# Patient Record
Sex: FEMALE | Race: WHITE | NOT HISPANIC OR LATINO | Employment: PART TIME | URBAN - METROPOLITAN AREA
[De-identification: names, ages, dates, MRNs, and addresses within clinical notes are randomized per-mention and may not be internally consistent; named-entity substitution may affect disease eponyms.]

---

## 2022-12-15 ENCOUNTER — HOSPITAL ENCOUNTER (OUTPATIENT)
Dept: RADIOLOGY | Facility: MEDICAL CENTER | Age: 52
Discharge: HOME | End: 2022-12-15

## 2022-12-15 ENCOUNTER — APPOINTMENT (OUTPATIENT)
Dept: LAB | Facility: MEDICAL CENTER | Age: 52
End: 2022-12-15

## 2022-12-15 DIAGNOSIS — R10.84 ABDOMINAL PAIN, GENERALIZED: ICD-10-CM

## 2022-12-15 LAB
ALBUMIN SERPL BCP-MCNC: 3.7 G/DL (ref 3.5–5)
ALP SERPL-CCNC: 62 U/L (ref 46–116)
ALT SERPL W P-5'-P-CCNC: 27 U/L (ref 12–78)
AMYLASE SERPL-CCNC: 55 IU/L (ref 25–115)
ANION GAP SERPL CALCULATED.3IONS-SCNC: 6 MMOL/L (ref 4–13)
AST SERPL W P-5'-P-CCNC: 20 U/L (ref 5–45)
BASOPHILS # BLD AUTO: 0.01 THOUSANDS/ÂΜL (ref 0–0.1)
BASOPHILS NFR BLD AUTO: 0 % (ref 0–1)
BILIRUB SERPL-MCNC: 0.63 MG/DL (ref 0.2–1)
BUN SERPL-MCNC: 14 MG/DL (ref 5–25)
CALCIUM SERPL-MCNC: 9.1 MG/DL (ref 8.3–10.1)
CHLORIDE SERPL-SCNC: 104 MMOL/L (ref 96–108)
CO2 SERPL-SCNC: 25 MMOL/L (ref 21–32)
CREAT SERPL-MCNC: 0.8 MG/DL (ref 0.6–1.3)
EOSINOPHIL # BLD AUTO: 0.03 THOUSAND/ÂΜL (ref 0–0.61)
EOSINOPHIL NFR BLD AUTO: 1 % (ref 0–6)
ERYTHROCYTE [DISTWIDTH] IN BLOOD BY AUTOMATED COUNT: 11.4 % (ref 11.6–15.1)
GFR SERPL CREATININE-BSD FRML MDRD: 86 ML/MIN/1.73SQ M
GLUCOSE P FAST SERPL-MCNC: 97 MG/DL (ref 65–99)
HCT VFR BLD AUTO: 41.4 % (ref 34.8–46.1)
HGB BLD-MCNC: 13.9 G/DL (ref 11.5–15.4)
IMM GRANULOCYTES # BLD AUTO: 0.01 THOUSAND/UL (ref 0–0.2)
IMM GRANULOCYTES NFR BLD AUTO: 0 % (ref 0–2)
LIPASE SERPL-CCNC: 129 U/L (ref 73–393)
LYMPHOCYTES # BLD AUTO: 1.15 THOUSANDS/ÂΜL (ref 0.6–4.47)
LYMPHOCYTES NFR BLD AUTO: 25 % (ref 14–44)
MCH RBC QN AUTO: 32.1 PG (ref 26.8–34.3)
MCHC RBC AUTO-ENTMCNC: 33.6 G/DL (ref 31.4–37.4)
MCV RBC AUTO: 96 FL (ref 82–98)
MONOCYTES # BLD AUTO: 0.33 THOUSAND/ÂΜL (ref 0.17–1.22)
MONOCYTES NFR BLD AUTO: 7 % (ref 4–12)
NEUTROPHILS # BLD AUTO: 3.05 THOUSANDS/ÂΜL (ref 1.85–7.62)
NEUTS SEG NFR BLD AUTO: 67 % (ref 43–75)
NRBC BLD AUTO-RTO: 0 /100 WBCS
PLATELET # BLD AUTO: 263 THOUSANDS/UL (ref 149–390)
PMV BLD AUTO: 9.3 FL (ref 8.9–12.7)
POTASSIUM SERPL-SCNC: 4.1 MMOL/L (ref 3.5–5.3)
PROT SERPL-MCNC: 7.6 G/DL (ref 6.4–8.4)
RBC # BLD AUTO: 4.33 MILLION/UL (ref 3.81–5.12)
SODIUM SERPL-SCNC: 135 MMOL/L (ref 135–147)
WBC # BLD AUTO: 4.58 THOUSAND/UL (ref 4.31–10.16)

## 2023-01-06 ENCOUNTER — OFFICE VISIT (OUTPATIENT)
Dept: GASTROENTEROLOGY | Facility: CLINIC | Age: 53
End: 2023-01-06

## 2023-01-06 VITALS
HEIGHT: 63 IN | SYSTOLIC BLOOD PRESSURE: 105 MMHG | DIASTOLIC BLOOD PRESSURE: 80 MMHG | BODY MASS INDEX: 26.58 KG/M2 | HEART RATE: 86 BPM | WEIGHT: 150 LBS

## 2023-01-06 DIAGNOSIS — Z83.79 FAMILY HISTORY OF INFLAMMATORY BOWEL DISEASE: ICD-10-CM

## 2023-01-06 DIAGNOSIS — R11.12 PROJECTILE VOMITING WITH NAUSEA: ICD-10-CM

## 2023-01-06 DIAGNOSIS — K21.9 GASTROESOPHAGEAL REFLUX DISEASE WITHOUT ESOPHAGITIS: Primary | ICD-10-CM

## 2023-01-06 DIAGNOSIS — R11.0 CHRONIC NAUSEA: ICD-10-CM

## 2023-01-06 DIAGNOSIS — Z12.11 COLON CANCER SCREENING: ICD-10-CM

## 2023-01-06 PROBLEM — R11.2 NAUSEA AND VOMITING: Status: ACTIVE | Noted: 2023-01-06

## 2023-01-06 RX ORDER — FAMOTIDINE 20 MG/1
20 TABLET, FILM COATED ORAL 2 TIMES DAILY
Qty: 120 TABLET | Refills: 1 | Status: SHIPPED | OUTPATIENT
Start: 2023-01-06

## 2023-01-06 RX ORDER — LEVOTHYROXINE SODIUM 0.07 MG/1
75 TABLET ORAL DAILY
COMMUNITY
Start: 2022-11-02 | End: 2023-11-02

## 2023-01-06 NOTE — PROGRESS NOTES
[facility] - Outpatient Follow-up Note  Aminah Castano 50 y o  female MRN: [id number]  Encounter: [id number]          ASSESSMENT AND PLAN:      1  Gastroesophageal reflux disease without esophagitis   patient with chronic reflux  Had upper endoscopy done less than 3 years ago  She does have almost daily reflux now as well as chronic nausea  She uses Pepcid off and on  Has never used continuously  Various triggers include sauces fried foods and Jodie nuts which she is allergic to  - EGD; Future  - famotidine (PEPCID) 20 mg tablet; Take 1 tablet (20 mg total) by mouth 2 (two) times a day  Dispense: 120 tablet; Refill: 1    2  Chronic nausea   start approximately 10 years ago has gotten progressively worse  - EGD; Future  - famotidine (PEPCID) 20 mg tablet; Take 1 tablet (20 mg total) by mouth 2 (two) times a day  Dispense: 120 tablet; Refill: 1    3  Projectile vomiting with nausea   this lasted for about a week it has since resolved  No a Yen aphasia no dysphagia  Had a normal CMP CBC TSH lipase amylase and ultrasound  - EGD; Future  - famotidine (PEPCID) 20 mg tablet; Take 1 tablet (20 mg total) by mouth 2 (two) times a day  Dispense: 120 tablet; Refill: 1    4  Colon cancer screening    Due for screening  - Colonoscopy; Future    5  Family history of inflammatory bowel disease   distant cousin  She will otherwise follow-up in 4 months  - Colonoscopy; Future    ______________________________________________________________________    SUBJECTIVE:   Very pleasant 82-year-old female presents with persistent vomiting which has since resolved since making her appointment  She did have a normal ultrasound CMP CBC TSH amylase lipase  Since age 38 has had chronic nausea  She did have an EGD about 3 years ago  She has a distant cousin with inflammatory bowel disease she does have chronic reflux for which she takes Pepcid off and on    This is brought on by certain sauces fried foods and hazelnuts  She has not lost any weight except for the week when she was having her vomiting episode which has since resolved  She is due for colon cancer screening  REVIEW OF SYSTEMS IS OTHERWISE NEGATIVE  Historical Information   Past Medical History:   Diagnosis Date   • Hypothyroidism      Past Surgical History:   Procedure Laterality Date   • UPPER GASTROINTESTINAL ENDOSCOPY       Social History   Social History     Substance and Sexual Activity   Alcohol Use Yes     Social History     Substance and Sexual Activity   Drug Use Never     Social History     Tobacco Use   Smoking Status Never   Smokeless Tobacco Never     History reviewed  No pertinent family history  Meds/Allergies       Current Outpatient Medications:   •  famotidine (PEPCID) 20 mg tablet  •  levothyroxine 75 mcg tablet    Allergies   Allergen Reactions   • Ciprofloxacin GI Intolerance           Objective     Blood pressure 105/80, pulse 86, height 5' 3" (1 6 m), weight 68 kg (150 lb)  Body mass index is 26 57 kg/m²  PHYSICAL EXAM:      General Appearance:   Alert, cooperative, no distress   HEENT:   Normocephalic, atraumatic, anicteric      Neck:  Supple, symmetrical, trachea midline   Lungs:   Clear to auscultation bilaterally; no rales, rhonchi or wheezing; respirations unlabored    Heart[de-identified]   Regular rate and rhythm; no murmur, rub, or gallop  Abdomen:   Soft, non-tender, non-distended; normal bowel sounds; no masses, no organomegaly    Genitalia:   Deferred    Rectal:   Deferred    Extremities:  No cyanosis, clubbing or edema    Pulses:  2+ and symmetric    Skin:  No jaundice, rashes, or lesions    Lymph nodes:  No palpable cervical lymphadenopathy        Lab Results:   No visits with results within 1 Day(s) from this visit     Latest known visit with results is:   Transcribe Orders on 12/15/2022   Component Date Value   • Color, UA 12/15/2022 Light Yellow    • Clarity, UA 12/15/2022 Clear    • Specific [place], UA 12/15/2022 1 010    • pH, UA 12/15/2022 7 0    • Leukocytes, UA 12/15/2022 Negative    • Nitrite, UA 12/15/2022 Negative    • Protein, UA 12/15/2022 Negative    • Glucose, UA 12/15/2022 Negative    • Ketones, UA 12/15/2022 20 (1+) (A)    • Urobilinogen, UA 12/15/2022 <2 0    • Bilirubin, UA 12/15/2022 Negative    • Occult Blood, UA 12/15/2022 Small (A)    • RBC, UA 12/15/2022 4-10 (A)    • WBC, UA 12/15/2022 1-2    • Epithelial Cells 12/15/2022 Occasional    • Bacteria, UA 12/15/2022 Occasional    • Ca Oxalate [place], UA 12/15/2022 Occasional (A)          Radiology Results:   US right upper quadrant    Result Date: 12/15/2022  Narrative: RIGHT UPPER QUADRANT ULTRASOUND INDICATION:     R10 84: Generalized abdominal pain  COMPARISON:  None TECHNIQUE:   Real-time ultrasound of the right upper quadrant was performed with a curvilinear transducer with both volumetric sweeps and still imaging techniques  FINDINGS: PANCREAS:  Visualized portions of the pancreas are within normal limits  AORTA AND IVC:  Visualized portions are normal for patient age  LIVER: Size:  Within normal range  The liver measures 13 7 cm in the midclavicular line  Contour:  Surface contour is smooth  Parenchyma:  Echogenicity and echotexture are within normal limits  No liver mass identified  Limited imaging of the main portal vein shows it to be patent and hepatopetal   BILIARY: No gallbladder findings  No intrahepatic biliary dilatation  CBD measures 3 0 mm  No choledocholithiasis  KIDNEY: Right kidney measures 10 3 x 5 0 x 5 0 cm  Volume 135 0 mL Kidney within normal limits  ASCITES:   None       Impression: Normal  Workstation performed: [id number]

## 2023-02-10 ENCOUNTER — TELEPHONE (OUTPATIENT)
Dept: GASTROENTEROLOGY | Facility: CLINIC | Age: 53
End: 2023-02-10

## 2023-02-10 NOTE — TELEPHONE ENCOUNTER
Left message for patient reminding her of her procedures 2/23 with Dr Jono Farrell at [facility]  She will need a , be called day prior with arrival time and be on clear liquids with bowel prep day before  Any questions, she is to call

## 2023-02-22 ENCOUNTER — NURSE TRIAGE (OUTPATIENT)
Dept: OTHER | Facility: OTHER | Age: 53
End: 2023-02-22

## 2023-02-23 ENCOUNTER — TELEPHONE (OUTPATIENT)
Dept: OTHER | Facility: OTHER | Age: 53
End: 2023-02-23

## 2023-02-23 ENCOUNTER — TELEPHONE (OUTPATIENT)
Dept: GASTROENTEROLOGY | Facility: AMBULARY SURGERY CENTER | Age: 53
End: 2023-02-23

## 2023-02-23 ENCOUNTER — PATIENT MESSAGE (OUTPATIENT)
Dept: GASTROENTEROLOGY | Facility: CLINIC | Age: 53
End: 2023-02-23

## 2023-02-23 RX ORDER — SODIUM CHLORIDE, SODIUM LACTATE, POTASSIUM CHLORIDE, CALCIUM CHLORIDE 600; 310; 30; 20 MG/100ML; MG/100ML; MG/100ML; MG/100ML
125 INJECTION, SOLUTION INTRAVENOUS CONTINUOUS
Status: CANCELLED | OUTPATIENT
Start: 2023-02-23

## 2023-02-23 NOTE — TELEPHONE ENCOUNTER
SPOKE WITH PT, REPORTS SHE WAS DOING WELL WITH MIRALAX/GATORADE PREP UNTIL @8PM GOT SEVERELY NAUSEATED AND STARTED VOMITING WHICH LASTED THRU THE NIGHT. SHE WOULD LIKE TO KNOW WHAT YOU RECOMMEND. PLEASE SEE HER [facility] MESSAGE TO YOU.

## 2023-02-23 NOTE — TELEPHONE ENCOUNTER
Regarding: colonoscopy prep issue  ----- Message from Bravo Bradford sent at 2/22/2023  9:27 PM EST -----  "I have a colonoscopy scheduled for tomorrow and I throw up everything , I also have a bad headache and im still nauseous"

## 2023-02-23 NOTE — TELEPHONE ENCOUNTER
Patients GI provider:  Dr. Sims    Number to return call: (  [phone]    Reason for call: Pt calling to speak with a nurse. She was up at night vomiting and had to cancel her procedure    Scheduled procedure/appointment date if applicable:  N/A

## 2023-02-23 NOTE — TELEPHONE ENCOUNTER
Patient called in stating she threw up around 930 entire prep after taking it as instructed   Stats really bad headache in the back of her head which moves down into here neck, and nausea  States unsure if she can continue second half of prep  Spoke to on call  States if patient unable to continue with second half of prep she will need to r/s  Advised patient to continue with clear liquids and see if symptoms subside  If symptoms worsen patient needs to be seen in ED  Called pt  Advised on recommendations  Patient verbalized understanding  Reason for Disposition  • Abdominal bloating, cramping, nausea, or vomiting while drinking bowel prep, questions about    Answer Assessment - Initial Assessment Questions  1  DATE/TIME: "When did you have your colonoscopy?"       1/23/23  2  MAIN CONCERN: "What is your main concern right now?" "What questions do you have?"     Nausea headache back of head and neck  vomiting   3  ABDOMEN PAIN: "Are you having any abdomen (belly or stomach) pain?" If Yes, ask: "How bad is it?" (e g , Scale 1-10; mild, moderate, severe)  - MILD (1-3): doesn't interfere with normal activities, abdomen soft and not tender to touch      - MODERATE (4-7): interferes with normal activities or awakens from sleep, tender to touch      - SEVERE (8-10): excruciating pain, doubled over, unable to do any normal activities       denies  4  OTHER SYMPTOMS: "What other symptoms are you having?" (e g , rectal bleeding, bloating or feeling gassy, passing gas, vomiting, dizziness, fever)  chills  5  ONSET: "When did your symptoms start?"     20 min   6   PATTERN: "Is the symptom(s) constant or does it come and go?" "Is your symptom(s) getting worse, better, or staying the same?"     Feels like another  Wave is coming on    Protocols used: COLONOSCOPY SYMPTOMS AND QUESTIONS-ADULT-

## 2023-02-24 ENCOUNTER — TELEPHONE (OUTPATIENT)
Dept: GASTROENTEROLOGY | Facility: CLINIC | Age: 53
End: 2023-02-24

## 2023-02-24 DIAGNOSIS — Z12.11 COLON CANCER SCREENING: Primary | ICD-10-CM

## 2023-02-24 RX ORDER — SODIUM, POTASSIUM,MAG SULFATES 17.5-3.13G
SOLUTION, RECONSTITUTED, ORAL ORAL
Qty: 177 ML | Refills: 0 | Status: SHIPPED | OUTPATIENT
Start: 2023-02-24 | End: 2023-02-27 | Stop reason: ALTCHOICE

## 2023-02-24 RX ORDER — ONDANSETRON 4 MG/1
TABLET, ORALLY DISINTEGRATING ORAL
Qty: 2 TABLET | Refills: 0 | Status: SHIPPED | OUTPATIENT
Start: 2023-02-24

## 2023-02-24 NOTE — TELEPHONE ENCOUNTER
Scheduled date of EGD/colonoscopy (as of today): 4/14/23  Physician performing EGD/colonoscopy: Dr Kaila Kimbrough   Location of EGD/colonoscopy: [facility]  Desired bowel prep reviewed with patient: Suprep emailed   Instructions reviewed with patient by: guero  Clearances:  n/a

## 2023-02-24 NOTE — TELEPHONE ENCOUNTER
Maximo Liu 29 Assessment    Name: Tameka Rodríguez  YOB: 1970  Last Height: 5' 3" (1 6 m)  Last weight: 68 kg (150 lb)  BMI: 26 57 kg/m²  Procedure: Colon/EGD  Diagnosis: see order  Date of procedure: 4/14/23  Prep: Suprep emailed   Responsible : yes  Phone#: [phone]  Name completing form: Boni Alvaresn  Date form completed: 02/24/23      If the patient answers yes to any of these questions, schedule in a hospital  Are you pregnant: No  Do you rely on a wheelchair for mobility: No  Have you been diagnosed with End Stage Renal Disease (ESRD): No  Do you need oxygen during the day: No  Have you had a heart attack or stroke within the past three months: No  Have you had a seizure within the past three months: No  Have you ever been informed by anesthesia that you have a difficult airway: No  Additional Questions  Have you had any cardiac testing or are under the care of a Cardiologist (see cardiac list): No  Cardiac list:   Do you have an implanted cardiac defibrillator: No (Comment:  This patient should be scheduled in the hospital)    Have any bleeding problems, such as anemia or hemophilia (If patient has H&H result below 8, schedule in hospital   H&H must be within 30 days of procedure): No    Had an organ transplant within the past 3 months: No    Do you have any present infections: No  Do you get short of breath when walking a few blocks: No  Have you been diagnosed with diabetes: No  Comments (provide cardiac provider information if applicable):

## 2023-02-24 NOTE — TELEPHONE ENCOUNTER
Dr. Sims, I called and spoke to pt and rescheduled her for 4/14/23.  Could you please send in scripts for the Suprep, Reglan, and Zofran?  I emailed her the suprep instructions. Thank you so much!

## 2023-02-27 ENCOUNTER — TELEPHONE (OUTPATIENT)
Dept: GASTROENTEROLOGY | Facility: CLINIC | Age: 53
End: 2023-02-27

## 2023-02-27 DIAGNOSIS — Z12.11 COLON CANCER SCREENING: Primary | ICD-10-CM

## 2023-02-27 RX ORDER — SODIUM PICOSULFATE, MAGNESIUM OXIDE, AND ANHYDROUS CITRIC ACID 10; 3.5; 12 MG/160ML; G/160ML; G/160ML
160 LIQUID ORAL 2 TIMES DAILY
Qty: 320 ML | Refills: 0 | Status: SHIPPED | OUTPATIENT
Start: 2023-02-27

## 2023-02-27 NOTE — TELEPHONE ENCOUNTER
----- Message from Carey Hall MD sent at 2/27/2023  2:47 PM EST -----  Regarding: FW: Colonoscopy prep vomiting   Contact: [phone]  Have her do Clenpiq  ----- Message -----  From: Smita Ramires  Sent: 2/24/2023  10:35 AM EST  To: Carey Hall MD  Subject: FW: Colonoscopy prep vomiting                    Hi Dr Oleg Decker, just wanted to let you know before the patient decides about the Suprep, that we no longer can offer this prep  It is not one of our standard preps  We can only offer the Gatorade, Miralax, Dulcolax prep, Golytely, Sutab and Clenpiq  Thank you!  ----- Message -----  From: Florin Barber LPN  Sent: 8/33/7352   3:34 PM EST  To: Carey Hall MD, Smita Ramires  Subject: FW: Colonoscopy prep vomiting                      ----- Message -----  From: Jina Rios: 2/23/2023   3:10 PM EST  To: , #  Subject: Colonoscopy prep vomiting                        Ann Taylor  I apologize that I was unable to make the procedures today  After the 8pm pills I started to get very nauseous, I vomited several times w chills & severe headache  I called & spoke w a nurse who said to see if I could finish the other 1/2 of the mixture later, if not to call & cancel  I was vomiting through the night so I called at 6am to cancel  I found out that this message never found its way back to the office  Can I reschedule w another method?

## 2023-02-27 NOTE — TELEPHONE ENCOUNTER
----- Message from Marquis Rubio LPN sent at 3/51/6785  3:33 PM EST -----  Regarding: FW: Colonoscopy prep vomiting   Contact: [phone]    ----- Message -----  From: Jony David: 2/23/2023   3:10 PM EST  To: , #  Subject: Colonoscopy prep vomiting                        Ann Taylor  I apologize that I was unable to make the procedures today  After the 8pm pills I started to get very nauseous, I vomited several times w chills & severe headache  I called & spoke w a nurse who said to see if I could finish the other 1/2 of the mixture later, if not to call & cancel  I was vomiting through the night so I called at 6am to cancel  I found out that this message never found its way back to the office  Can I reschedule w another method?

## 2023-02-27 NOTE — TELEPHONE ENCOUNTER
Left message for patient to call and let us know which prep does she want us to send into pharmacy so I can get her the instructions  Will follow up with her in a couple of weeks if she doesn't return call

## 2023-02-27 NOTE — TELEPHONE ENCOUNTER
Left message for patient that I am emailing Clenpiq instructions and Dr Vy Mcmahon will be sending into pharmacy

## 2023-03-07 PROBLEM — Z12.11 COLON CANCER SCREENING: Status: RESOLVED | Noted: 2023-01-06 | Resolved: 2023-03-07

## 2023-03-20 DIAGNOSIS — R11.0 CHRONIC NAUSEA: ICD-10-CM

## 2023-03-20 DIAGNOSIS — R11.12 PROJECTILE VOMITING WITH NAUSEA: ICD-10-CM

## 2023-03-20 DIAGNOSIS — K21.9 GASTROESOPHAGEAL REFLUX DISEASE WITHOUT ESOPHAGITIS: ICD-10-CM

## 2023-03-20 RX ORDER — FAMOTIDINE 20 MG/1
20 TABLET, FILM COATED ORAL 2 TIMES DAILY
Qty: 120 TABLET | Refills: 1 | Status: SHIPPED | OUTPATIENT
Start: 2023-03-20

## 2023-09-14 ENCOUNTER — APPOINTMENT (EMERGENCY)
Dept: RADIOLOGY | Facility: HOSPITAL | Age: 53
End: 2023-09-14
Payer: COMMERCIAL

## 2023-09-14 ENCOUNTER — APPOINTMENT (EMERGENCY)
Dept: CT IMAGING | Facility: HOSPITAL | Age: 53
End: 2023-09-14
Payer: COMMERCIAL

## 2023-09-14 ENCOUNTER — HOSPITAL ENCOUNTER (EMERGENCY)
Facility: HOSPITAL | Age: 53
Discharge: HOME | End: 2023-09-14
Attending: EMERGENCY MEDICINE
Payer: COMMERCIAL

## 2023-09-14 VITALS
TEMPERATURE: 97.6 F | OXYGEN SATURATION: 97 % | RESPIRATION RATE: 16 BRPM | HEART RATE: 82 BPM | SYSTOLIC BLOOD PRESSURE: 121 MMHG | DIASTOLIC BLOOD PRESSURE: 79 MMHG

## 2023-09-14 DIAGNOSIS — R42 LIGHTHEADEDNESS: ICD-10-CM

## 2023-09-14 DIAGNOSIS — S46.811A TRAPEZIUS STRAIN, RIGHT, INITIAL ENCOUNTER: Primary | ICD-10-CM

## 2023-09-14 DIAGNOSIS — E87.6 HYPOKALEMIA: ICD-10-CM

## 2023-09-14 DIAGNOSIS — S16.1XXA STRAIN OF CERVICAL PORTION OF RIGHT TRAPEZIUS MUSCLE: ICD-10-CM

## 2023-09-14 LAB
ALBUMIN SERPL BCP-MCNC: 4.5 G/DL (ref 3.5–5)
ALP SERPL-CCNC: 55 U/L (ref 34–104)
ALT SERPL W P-5'-P-CCNC: 18 U/L (ref 7–52)
ANION GAP SERPL CALCULATED.3IONS-SCNC: 8 MMOL/L
APTT PPP: 27 SECONDS (ref 23–37)
AST SERPL W P-5'-P-CCNC: 18 U/L (ref 13–39)
ATRIAL RATE: 90 BPM
BACTERIA UR QL AUTO: ABNORMAL /HPF
BASOPHILS # BLD AUTO: 0.05 THOUSANDS/ÂΜL (ref 0–0.1)
BASOPHILS NFR BLD AUTO: 1 % (ref 0–1)
BILIRUB SERPL-MCNC: 0.33 MG/DL (ref 0.2–1)
BILIRUB UR QL STRIP: NEGATIVE
BUN SERPL-MCNC: 20 MG/DL (ref 5–25)
CALCIUM SERPL-MCNC: 9.9 MG/DL (ref 8.4–10.2)
CARDIAC TROPONIN I PNL SERPL HS: 2 NG/L
CHLORIDE SERPL-SCNC: 106 MMOL/L (ref 96–108)
CLARITY UR: CLEAR
CO2 SERPL-SCNC: 25 MMOL/L (ref 21–32)
COLOR UR: YELLOW
CREAT SERPL-MCNC: 0.88 MG/DL (ref 0.6–1.3)
EOSINOPHIL # BLD AUTO: 0.08 THOUSAND/ÂΜL (ref 0–0.61)
EOSINOPHIL NFR BLD AUTO: 1 % (ref 0–6)
ERYTHROCYTE [DISTWIDTH] IN BLOOD BY AUTOMATED COUNT: 11.3 % (ref 11.6–15.1)
FLUAV RNA RESP QL NAA+PROBE: NEGATIVE
FLUBV RNA RESP QL NAA+PROBE: NEGATIVE
GFR SERPL CREATININE-BSD FRML MDRD: 76 ML/MIN/1.73SQ M
GLUCOSE SERPL-MCNC: 94 MG/DL (ref 65–140)
GLUCOSE UR STRIP-MCNC: NEGATIVE MG/DL
HCT VFR BLD AUTO: 40.6 % (ref 34.8–46.1)
HGB BLD-MCNC: 13.8 G/DL (ref 11.5–15.4)
HGB UR QL STRIP.AUTO: ABNORMAL
IMM GRANULOCYTES # BLD AUTO: 0.03 THOUSAND/UL (ref 0–0.2)
IMM GRANULOCYTES NFR BLD AUTO: 0 % (ref 0–2)
INR PPP: 0.89 (ref 0.84–1.19)
KETONES UR STRIP-MCNC: NEGATIVE MG/DL
LACTATE SERPL-SCNC: 1.7 MMOL/L (ref 0.5–2)
LEUKOCYTE ESTERASE UR QL STRIP: NEGATIVE
LIPASE SERPL-CCNC: 21 U/L (ref 11–82)
LYMPHOCYTES # BLD AUTO: 1.46 THOUSANDS/ÂΜL (ref 0.6–4.47)
LYMPHOCYTES NFR BLD AUTO: 15 % (ref 14–44)
MAGNESIUM SERPL-MCNC: 2 MG/DL (ref 1.9–2.7)
MCH RBC QN AUTO: 31.9 PG (ref 26.8–34.3)
MCHC RBC AUTO-ENTMCNC: 34 G/DL (ref 31.4–37.4)
MCV RBC AUTO: 94 FL (ref 82–98)
MONOCYTES # BLD AUTO: 0.34 THOUSAND/ÂΜL (ref 0.17–1.22)
MONOCYTES NFR BLD AUTO: 4 % (ref 4–12)
MUCOUS THREADS UR QL AUTO: ABNORMAL
NEUTROPHILS # BLD AUTO: 7.73 THOUSANDS/ÂΜL (ref 1.85–7.62)
NEUTS SEG NFR BLD AUTO: 79 % (ref 43–75)
NITRITE UR QL STRIP: NEGATIVE
NON-SQ EPI CELLS URNS QL MICRO: ABNORMAL /HPF
NRBC BLD AUTO-RTO: 0 /100 WBCS
P AXIS: 66 DEGREES
PH UR STRIP.AUTO: 6 [PH]
PLATELET # BLD AUTO: 329 THOUSANDS/UL (ref 149–390)
PMV BLD AUTO: 9.3 FL (ref 8.9–12.7)
POTASSIUM SERPL-SCNC: 3.3 MMOL/L (ref 3.5–5.3)
PR INTERVAL: 162 MS
PROCALCITONIN SERPL-MCNC: <0.05 NG/ML
PROT SERPL-MCNC: 7.8 G/DL (ref 6.4–8.4)
PROT UR STRIP-MCNC: NEGATIVE MG/DL
PROTHROMBIN TIME: 12.6 SECONDS (ref 11.6–14.5)
QRS AXIS: 55 DEGREES
QRSD INTERVAL: 86 MS
QT INTERVAL: 350 MS
QTC INTERVAL: 428 MS
RBC # BLD AUTO: 4.33 MILLION/UL (ref 3.81–5.12)
RBC #/AREA URNS AUTO: ABNORMAL /HPF
RSV RNA RESP QL NAA+PROBE: NEGATIVE
SARS-COV-2 RNA RESP QL NAA+PROBE: NEGATIVE
SODIUM SERPL-SCNC: 139 MMOL/L (ref 135–147)
SP GR UR STRIP.AUTO: 1.03 (ref 1–1.03)
T WAVE AXIS: 33 DEGREES
UROBILINOGEN UR STRIP-ACNC: <2 MG/DL
VENTRICULAR RATE: 90 BPM
WBC # BLD AUTO: 9.69 THOUSAND/UL (ref 4.31–10.16)
WBC #/AREA URNS AUTO: ABNORMAL /HPF

## 2023-09-14 PROCEDURE — 99285 EMERGENCY DEPT VISIT HI MDM: CPT

## 2023-09-14 PROCEDURE — 84145 PROCALCITONIN (PCT): CPT | Performed by: EMERGENCY MEDICINE

## 2023-09-14 PROCEDURE — 96361 HYDRATE IV INFUSION ADD-ON: CPT

## 2023-09-14 PROCEDURE — 87040 BLOOD CULTURE FOR BACTERIA: CPT | Performed by: EMERGENCY MEDICINE

## 2023-09-14 PROCEDURE — 96375 TX/PRO/DX INJ NEW DRUG ADDON: CPT

## 2023-09-14 PROCEDURE — 93005 ELECTROCARDIOGRAM TRACING: CPT

## 2023-09-14 PROCEDURE — 71045 X-RAY EXAM CHEST 1 VIEW: CPT

## 2023-09-14 PROCEDURE — 86617 LYME DISEASE ANTIBODY: CPT | Performed by: EMERGENCY MEDICINE

## 2023-09-14 PROCEDURE — 86618 LYME DISEASE ANTIBODY: CPT | Performed by: EMERGENCY MEDICINE

## 2023-09-14 PROCEDURE — 81001 URINALYSIS AUTO W/SCOPE: CPT | Performed by: EMERGENCY MEDICINE

## 2023-09-14 PROCEDURE — 84484 ASSAY OF TROPONIN QUANT: CPT | Performed by: EMERGENCY MEDICINE

## 2023-09-14 PROCEDURE — 0241U HB NFCT DS VIR RESP RNA 4 TRGT: CPT | Performed by: EMERGENCY MEDICINE

## 2023-09-14 PROCEDURE — 85610 PROTHROMBIN TIME: CPT | Performed by: EMERGENCY MEDICINE

## 2023-09-14 PROCEDURE — 96374 THER/PROPH/DIAG INJ IV PUSH: CPT

## 2023-09-14 PROCEDURE — 93010 ELECTROCARDIOGRAM REPORT: CPT | Performed by: INTERNAL MEDICINE

## 2023-09-14 PROCEDURE — 83690 ASSAY OF LIPASE: CPT | Performed by: EMERGENCY MEDICINE

## 2023-09-14 PROCEDURE — 85730 THROMBOPLASTIN TIME PARTIAL: CPT | Performed by: EMERGENCY MEDICINE

## 2023-09-14 PROCEDURE — 70450 CT HEAD/BRAIN W/O DYE: CPT

## 2023-09-14 PROCEDURE — 85025 COMPLETE CBC W/AUTO DIFF WBC: CPT | Performed by: EMERGENCY MEDICINE

## 2023-09-14 PROCEDURE — 87207 SMEAR SPECIAL STAIN: CPT | Performed by: EMERGENCY MEDICINE

## 2023-09-14 PROCEDURE — 99285 EMERGENCY DEPT VISIT HI MDM: CPT | Performed by: EMERGENCY MEDICINE

## 2023-09-14 PROCEDURE — 83605 ASSAY OF LACTIC ACID: CPT | Performed by: EMERGENCY MEDICINE

## 2023-09-14 PROCEDURE — 87468 ANAPLSMA PHGCYTOPHLM AMP PRB: CPT | Performed by: EMERGENCY MEDICINE

## 2023-09-14 PROCEDURE — 36415 COLL VENOUS BLD VENIPUNCTURE: CPT | Performed by: EMERGENCY MEDICINE

## 2023-09-14 PROCEDURE — 83735 ASSAY OF MAGNESIUM: CPT | Performed by: EMERGENCY MEDICINE

## 2023-09-14 PROCEDURE — 80053 COMPREHEN METABOLIC PANEL: CPT | Performed by: EMERGENCY MEDICINE

## 2023-09-14 RX ORDER — ONDANSETRON 2 MG/ML
4 INJECTION INTRAMUSCULAR; INTRAVENOUS ONCE
Status: COMPLETED | OUTPATIENT
Start: 2023-09-14 | End: 2023-09-14

## 2023-09-14 RX ORDER — MECLIZINE HYDROCHLORIDE 25 MG/1
25 TABLET ORAL ONCE
Status: COMPLETED | OUTPATIENT
Start: 2023-09-14 | End: 2023-09-14

## 2023-09-14 RX ORDER — ACETAMINOPHEN 325 MG/1
650 TABLET ORAL ONCE
Status: COMPLETED | OUTPATIENT
Start: 2023-09-14 | End: 2023-09-14

## 2023-09-14 RX ORDER — POTASSIUM CHLORIDE 20 MEQ/1
40 TABLET, EXTENDED RELEASE ORAL ONCE
Status: COMPLETED | OUTPATIENT
Start: 2023-09-14 | End: 2023-09-14

## 2023-09-14 RX ORDER — LIDOCAINE 50 MG/G
1 PATCH TOPICAL ONCE
Status: DISCONTINUED | OUTPATIENT
Start: 2023-09-14 | End: 2023-09-14 | Stop reason: HOSPADM

## 2023-09-14 RX ORDER — KETOROLAC TROMETHAMINE 30 MG/ML
15 INJECTION, SOLUTION INTRAMUSCULAR; INTRAVENOUS ONCE
Status: COMPLETED | OUTPATIENT
Start: 2023-09-14 | End: 2023-09-14

## 2023-09-14 RX ADMIN — POTASSIUM CHLORIDE 40 MEQ: 1500 TABLET, EXTENDED RELEASE ORAL at 18:21

## 2023-09-14 RX ADMIN — LIDOCAINE 1 PATCH: 50 PATCH TOPICAL at 18:21

## 2023-09-14 RX ADMIN — ACETAMINOPHEN 650 MG: 325 TABLET, FILM COATED ORAL at 16:56

## 2023-09-14 RX ADMIN — ONDANSETRON 4 MG: 2 INJECTION INTRAMUSCULAR; INTRAVENOUS at 17:14

## 2023-09-14 RX ADMIN — KETOROLAC TROMETHAMINE 15 MG: 30 INJECTION, SOLUTION INTRAMUSCULAR; INTRAVENOUS at 18:21

## 2023-09-14 RX ADMIN — SODIUM CHLORIDE 1000 ML: 0.9 INJECTION, SOLUTION INTRAVENOUS at 17:17

## 2023-09-14 RX ADMIN — MECLIZINE HYDROCHLORIDE 25 MG: 25 TABLET ORAL at 16:56

## 2023-09-14 NOTE — ED PROVIDER NOTES
Pt Name: Tim Arguelles  MRN: [id number]  [address] 1970  Age/Sex: 50 y.o. female  Date of evaluation: 9/14/2023  PCP: Azul Hoffman MD    [address]    Chief Complaint   Patient presents with   • Weakness - Generalized     Pt reports body aches, stiff neck, shortness of breath, difficulty ambulating, headaches. Sent in by urgent care. Able to extend neck up and down, but complains of burning feeling         HPI and MDM    50 y.o. female presenting with neck pain. Patient states since yesterday morning she has had pain in her neck. Feels stiff. Primarily right-sided. She slept on a new pillow. Today she had chills, does not know if she had any fevers. Did not check her temperature. She states her temperature was not checked at her doctor's visit. Also mentions that she has been feeling lightheaded, has a history of vertigo. Has had nausea and vomiting. No chest pain or abdominal pain. No muscle aches or arthralgias. No known sick contacts, no recent traveling or surgeries. Denies any medical problems. Patient states she recently had a normal brain MRI. Differential diagnosis considered includes but not limited to muscular strain, vertigo, viral illness, electrolyte abnormalities, dehydration,, meningitis although low suspicion. Patient was seen by family doctor, sent to the emergency department with concern for possible meningitis. Her vitals in the emergency room reassuring, she is afebrile, no actual documented fever, did mention an episode of chills. My suspicion overall for meningitis is fairly low, patient does have a history of vertigo, her pain does appear to be musculoskeletal in nature, however we will obtain blood work, CT head. I discussed with patient, she is in agreement with reevaluation and blood work and then revisiting the topic of work-up for meningitis which would include lumbar puncture.     ED Course as of 09/14/23 1905   Thu Sep 14, 2023   1659 XR chest portable  Per my independent interpretation of chest x-ray, no acute cardiopulmonary processes. [id number] Per my independent interpretation of EKG, normal sinus rhythm heart rate 90, narrow QRS, normal axis, no was reassuring, no STEMI. Upon review of blood work, it is reassuring, no leukocytosis, procalcitonin is not elevated, no lactic acidosis. Mild hypokalemia, given supplemental potassium. UA not concerning for infection. Patient was updated with all results including CT head results. Upon reevaluation she is feeling significantly better, states she feels a lot better. I once again discussed with her the utility of obtaining a lumbar puncture to assess for possible meningitis, after shared decision making, patient would like to defer lumbar puncture at this time. Tickborne illness is also another possibility, therefore will obtain Lyme titers and parasite smear and anaplasmosis test.  Patient would like to pursue supportive care at this time, advise supportive care at home, close PCP follow-up, strict return precautions were discussed, patient verbalized understanding and is in agreement with plan. She was also provided with a report for her CT head results.     Medications   lidocaine (LIDODERM) 5 % patch 1 patch (1 patch Topical Medication Applied 9/14/23 1821)   sodium chloride 0.9 % bolus 1,000 mL (1,000 mL Intravenous New Bag 9/14/23 1717)   ondansetron (ZOFRAN) injection 4 mg (4 mg Intravenous Given 9/14/23 1714)   meclizine (ANTIVERT) tablet 25 mg (25 mg Oral Given 9/14/23 1656)   acetaminophen (TYLENOL) tablet 650 mg (650 mg Oral Given 9/14/23 1656)   ketorolac (TORADOL) injection 15 mg (15 mg Intravenous Given 9/14/23 1821)   potassium chloride (K-DUR,KLOR-CON) CR tablet 40 mEq (40 mEq Oral Given 9/14/23 1821)         Past Medical and Surgical History    Past Medical History:   Diagnosis Date   • Anxiety    • Chronic nausea    • GERD (gastroesophageal reflux disease)    • Hiatal hernia    • Hx of vertigo    • Hypothyroidism    • Migraines    • Scoliosis    • Seasonal allergies     denies postnasal drip today 4/14/23   • Spasm of esophagus    • TMJ (dislocation of temporomandibular joint)     see chiropractor       Past Surgical History:   Procedure Laterality Date   • CYST REMOVAL      neck  age 11   • UPPER GASTROINTESTINAL ENDOSCOPY         History reviewed. No pertinent family history. Social History     Tobacco Use   • Smoking status: Never   • Smokeless tobacco: Never   Vaping Use   • Vaping Use: Never used   Substance Use Topics   • Alcohol use: Yes     Alcohol/week: 5.0 standard drinks of alcohol     Types: 5 Glasses of wine per week     Comment: wine   • Drug use: Never           Allergies    Allergies   Allergen Reactions   • Ciprofloxacin GI Intolerance       Home Medications    Prior to Admission medications    Medication Sig Start Date End Date Taking?  Authorizing Provider   acetaminophen (TYLENOL) 325 mg tablet Take 650 mg by mouth every 6 (six) hours as needed for mild pain    Historical Provider, MD   buPROPion (WELLBUTRIN XL) 150 mg 24 hr tablet Take 150 mg by mouth every morning 1/13/23   Historical Provider, MD   levothyroxine 75 mcg tablet Take 75 mcg by mouth daily 11/2/22 11/2/23  Historical Provider, MD   omeprazole (PriLOSEC) 20 mg delayed release capsule TAKE 1 CAPSULE (20 MG TOTAL) BY MOUTH DAILY 6/27/23   Jovan Tucker MD   ondansetron (ZOFRAN-ODT) 4 mg disintegrating tablet Take 1 tablet by mouth prior to starting your bowel prep take the second 1 after completion of your bowel prep 2/24/23   Jovan Tucker MD           Physical Exam      ED Triage Vitals   Temperature Pulse Respirations Blood Pressure SpO2   09/14/23 1609 09/14/23 1609 09/14/23 1609 09/14/23 1609 09/14/23 1609   97.6 °F (36.4 °C) 88 18 162/72 99 %      Temp Source Heart Rate Source Patient Position - Orthostatic VS BP Location FiO2 (%)   09/14/23 1609 09/14/23 1609 09/14/23 1609 09/14/23 1609 --   Temporal Monitor Sitting Left arm       Pain Score       09/14/23 1656       5               Physical Exam  Constitutional:       General: She is not in acute distress. HENT:      Head: Normocephalic and atraumatic. Nose: Nose normal.      Mouth/Throat:      Mouth: Mucous membranes are moist.   Eyes:      Extraocular Movements: Extraocular movements intact. Pupils: Pupils are equal, round, and reactive to light. Neck:      Comments: Negative Brudzinski's test, no meningismus. Patient has primarily right paracervical and trapezius hypertonicity and tenderness. She has full range of motion of her neck. Cardiovascular:      Rate and Rhythm: Normal rate and regular rhythm. Pulmonary:      Effort: Pulmonary effort is normal. No respiratory distress. Abdominal:      General: There is no distension. Palpations: Abdomen is soft. Tenderness: There is no abdominal tenderness. Musculoskeletal:         General: No swelling or deformity. Normal range of motion. Cervical back: Normal range of motion and neck supple. Skin:     General: Skin is warm. Findings: No erythema. Neurological:      Mental Status: She is alert and oriented to person, place, and time. Cranial Nerves: No cranial nerve deficit. Sensory: No sensory deficit. Motor: No weakness.               Diagnostic Results      Labs:    Results Reviewed     Procedure Component Value Units Date/Time    Lyme Total AB W Reflex to IGM/IGG [[id number]]     Lab Status: No result Specimen: Blood     Blood Parasite smear [[id number]]     Lab Status: No result Specimen: Blood     Anaplasma Phagocytophilum, PCR [[id number]]     Lab Status: No result Specimen: Blood     Urine Microscopic [[id number]]  (Abnormal) Collected: 09/14/23 1824    Lab Status: Final result Specimen: Urine, Clean Catch Updated: 09/14/23 1837     RBC, UA 10-20 /hpf      WBC, UA None Seen /hpf      Epithelial Cells Occasional /hpf      Bacteria, UA None Seen /hpf      MUCUS THREADS Occasional    UA w Reflex to Microscopic w Reflex to Culture [[id number]]  (Abnormal) Collected: 09/14/23 1824    Lab Status: Final result Specimen: Urine, Clean Catch Updated: 09/14/23 1833     Color, UA Yellow     Clarity, UA Clear     Specific Gravity, UA 1.027     pH, UA 6.0     Leukocytes, UA Negative     Nitrite, UA Negative     Protein, UA Negative mg/dl      Glucose, UA Negative mg/dl      Ketones, UA Negative mg/dl      Urobilinogen, UA <2.0 mg/dl      Bilirubin, UA Negative     Occult Blood, UA Small    FLU/RSV/COVID - if FLU/RSV clinically relevant [[id number]]  (Normal) Collected: 09/14/23 1712    Lab Status: Final result Specimen: Nares from Nose Updated: 09/14/23 1808     SARS-CoV-2 Negative     INFLUENZA A PCR Negative     INFLUENZA B PCR Negative     RSV PCR Negative    Narrative:      FOR PEDIATRIC PATIENTS - copy/paste COVID Guidelines URL to browser: [email or web address]    SARS-CoV-2 assay is a Nucleic Acid Amplification assay intended for the  qualitative detection of nucleic acid from SARS-CoV-2 in nasopharyngeal  swabs. Results are for the presumptive identification of SARS-CoV-2 RNA. Positive results are indicative of infection with SARS-CoV-2, the virus  causing COVID-19, but do not rule out bacterial infection or co-infection  with other viruses. Laboratories within the [facility] and its  territories are required to report all positive results to the appropriate  public health authorities. Negative results do not preclude SARS-CoV-2  infection and should not be used as the sole basis for treatment or other  patient management decisions. Negative results must be combined with  clinical observations, patient history, and epidemiological information. This test has not been FDA cleared or approved. This test has been authorized by FDA under an Emergency Use Authorization  (EUA).  This test is only authorized for the duration of time the  declaration that circumstances exist justifying the authorization of the  emergency use of an in vitro diagnostic tests for detection of SARS-CoV-2  virus and/or diagnosis of COVID-19 infection under section 564(b)(1) of  the Act, 21 U. S.C. 200JWH-2(N)(1), unless the authorization is terminated  or revoked sooner. The test has been validated but independent review by FDA  and CLIA is pending. Test performed using [employer] GeneXpert: This RT-PCR assay targets N2,  a region unique to SARS-CoV-2. A conserved region in the E-gene was chosen  for pan-Sarbecovirus detection which includes SARS-CoV-2. According to CMS-2020-01-R, this platform meets the definition of high-throughput technology. Procalcitonin [[id number]]  (Normal) Collected: 09/14/23 1712    Lab Status: Final result Specimen: Blood from Arm, Left Updated: 09/14/23 1759     Procalcitonin <0.05 ng/ml     HS Troponin 0hr (reflex protocol) [[id number]]  (Normal) Collected: 09/14/23 1712    Lab Status: Final result Specimen: Blood from Arm, Left Updated: 09/14/23 1758     hs TnI 0hr 2 ng/L     HS Troponin I 2hr [[id number]]     Lab Status: No result Specimen: Blood     Lactic acid [[id number]]  (Normal) Collected: 09/14/23 1712    Lab Status: Final result Specimen: Blood from Arm, Left Updated: 09/14/23 1747     LACTIC ACID 1.7 mmol/L     Narrative:      Result may be elevated if tourniquet was used during collection.     Comprehensive metabolic panel [[id number]]  (Abnormal) Collected: 09/14/23 1712    Lab Status: Final result Specimen: Blood from Arm, Left Updated: 09/14/23 1747     Sodium 139 mmol/L      Potassium 3.3 mmol/L      Chloride 106 mmol/L      CO2 25 mmol/L      ANION GAP 8 mmol/L      BUN 20 mg/dL      Creatinine 0.88 mg/dL      Glucose 94 mg/dL      Calcium 9.9 mg/dL      AST 18 U/L      ALT 18 U/L      Alkaline Phosphatase 55 U/L      Total Protein 7.8 g/dL      Albumin 4.5 g/dL      Total Bilirubin 0.33 mg/dL      eGFR 76 ml/min/1.73sq m     Narrative:      [facility] guidelines for Chronic Kidney Disease (CKD):   •  Stage 1 with normal or high GFR (GFR > 90 mL/min/1.73 square meters)  •  Stage 2 Mild CKD (GFR = 60-89 mL/min/1.73 square meters)  •  Stage 3A Moderate CKD (GFR = 45-59 mL/min/1.73 square meters)  •  Stage 3B Moderate CKD (GFR = 30-44 mL/min/1.73 square meters)  •  Stage 4 Severe CKD (GFR = 15-29 mL/min/1.73 square meters)  •  Stage 5 End Stage CKD (GFR <15 mL/min/1.73 square meters)  Note: GFR calculation is accurate only with a steady state creatinine    Magnesium [[id number]]  (Normal) Collected: 09/14/23 1712    Lab Status: Final result Specimen: Blood from Arm, Left Updated: 09/14/23 1747     Magnesium 2.0 mg/dL     Lipase [[id number]]  (Normal) Collected: 09/14/23 1712    Lab Status: Final result Specimen: Blood from Arm, Left Updated: 09/14/23 1747     Lipase 21 u/L     Protime-INR [[id number]]  (Normal) Collected: 09/14/23 1712    Lab Status: Final result Specimen: Blood from Arm, Left Updated: 09/14/23 1746     Protime 12.6 seconds      INR 0.89    APTT [[id number]]  (Normal) Collected: 09/14/23 1712    Lab Status: Final result Specimen: Blood from Arm, Left Updated: 09/14/23 1746     PTT 27 seconds     CBC and differential [[id number]]  (Abnormal) Collected: 09/14/23 1712    Lab Status: Final result Specimen: Blood from Arm, Left Updated: 09/14/23 1727     WBC 9.69 Thousand/uL      RBC 4.33 Million/uL      Hemoglobin 13.8 g/dL      Hematocrit 40.6 %      MCV 94 fL      MCH 31.9 pg      MCHC 34.0 g/dL      RDW 11.3 %      MPV 9.3 fL      Platelets 167 Thousands/uL      nRBC 0 /100 WBCs      Neutrophils Relative 79 %      Immat GRANS % 0 %      Lymphocytes Relative 15 %      Monocytes Relative 4 %      Eosinophils Relative 1 %      Basophils Relative 1 %      Neutrophils Absolute 7.73 Thousands/µL      Immature Grans Absolute 0.03 Thousand/uL      Lymphocytes Absolute 1.46 Thousands/µL      Monocytes Absolute 0.34 Thousand/µL      Eosinophils Absolute 0.08 Thousand/µL      Basophils Absolute 0.05 Thousands/µL     Blood culture #1 [[id number]] Collected: 09/14/23 1712    Lab Status: In process Specimen: Blood from Arm, Left Updated: 09/14/23 1722    Blood culture #2 [[id number]] Collected: 09/14/23 1714    Lab Status: In process Specimen: Blood from Arm, Right Updated: 09/14/23 1722          All labs reviewed and utilized in the medical decision making process    Radiology:    XR chest portable   Final Result      No acute cardiopulmonary disease. In the setting of clinically suspected/proven COVID-19, this plain film appearance does not contain findings that raise concern for viral pneumonia such as COVID-19, but does not rule out this diagnosis. Marked thoracic dextroscoliosis. Workstation performed: [id number]         CT head without contrast   Final Result      No acute intracranial abnormality. Linear isodense focus seen along the right frontal centrum semiovale (2/21-22) may be artifactual versus developmental venous anomaly versus focal gray matter heterotopia. If relevant, this could be evaluated with outpatient MRI brain. Workstation performed: [id number]             All radiology studies independently viewed by me and interpreted by the radiologist.    Procedure    Procedures        FINAL IMPRESSION    Final diagnoses:   Trapezius strain, right, initial encounter   Strain of cervical portion of right trapezius muscle   Lightheadedness   Hypokalemia         DISPOSITION    Time reflects when diagnosis was documented in both MDM as applicable and the Disposition within this note     Time User Action Codes Description Comment    9/14/2023  6:51 PM Diony Rausch Add [V28.636Q] Trapezius strain, right, initial encounter     9/14/2023  6:51 PM Diony Rausch Add [S16. 1XXA] Strain of cervical portion of right trapezius muscle     9/14/2023  6:51 PM Agustina Praneeth Add [R42] Lightheadedness     9/14/2023  6:54 PM Agustina Praneeth Add [E87.6] Hypokalemia       ED Disposition     ED Disposition   Discharge    Condition   Stable    Date/Time   Thu Sep 14, 2023  6:51 PM    Comment   Justice Peoples discharge to home/self care. Follow-up Information     Follow up With Specialties Details Why Contact Info    Lindy Lange MD Internal Medicine Call in 1 day  [id number] [facility]  [phone]              PATIENT REFERRED TO:    Lindy Lange MD  [id number] [facility]  [phone]    Call in 1 day        DISCHARGE MEDICATIONS:    Patient's Medications   Discharge Prescriptions    No medications on file       No discharge procedures on file. Jean-Paul Torrez DO        This note was partially completed using voice recognition technology, and was scanned for gross errors; however some errors may still exist. Please contact the author with any questions or requests for clarification.       Jean-Paul Torrez DO  09/14/23 5093

## 2023-09-15 LAB
B BURGDOR IGG SERPL QL IA: POSITIVE
B BURGDOR IGG+IGM SER QL IA: POSITIVE
B BURGDOR IGM SERPL QL IA: NEGATIVE
PATHOLOGIST INTERPRETATION: NORMAL

## 2023-09-16 LAB
% PARASITEMIA: 0
PARASITE BLD: NO

## 2023-09-16 NOTE — RESULT ENCOUNTER NOTE
Patient returned call and was notified of Lyme testing result. IgG positive, IgM negative. Patient does report having Lyme disease in the past and was treated. No indication for additional abx. Call back complete.

## 2023-09-20 LAB
A PHAGOCYTOPH DNA BLD QL NAA+PROBE: NEGATIVE
BACTERIA BLD CULT: NORMAL
BACTERIA BLD CULT: NORMAL

## 2023-10-30 ENCOUNTER — OFFICE VISIT (OUTPATIENT)
Dept: AUDIOLOGY | Age: 53
End: 2023-10-30
Payer: COMMERCIAL

## 2023-10-30 DIAGNOSIS — R42 DIZZINESS AND GIDDINESS: Primary | ICD-10-CM

## 2023-10-30 PROCEDURE — 92540 BASIC VESTIBULAR EVALUATION: CPT | Performed by: AUDIOLOGIST

## 2023-10-30 PROCEDURE — 92537 CALORIC VSTBLR TEST W/REC: CPT | Performed by: AUDIOLOGIST

## 2023-10-30 PROCEDURE — 92567 TYMPANOMETRY: CPT | Performed by: AUDIOLOGIST

## 2023-10-30 NOTE — PROGRESS NOTES
Videonystagmography (VNG) Evaluation    Name:  Galen Mayorga  :  1970  Age:  50 y.o. Date of Evaluation: 10/30/23     History: Dizziness  Reason for visit: Galen Mayorga is seen today at the request of Tereza Patrick for an evaluation of balance. Patient complains of dizziness she describes as feeling like she is rocking in a boat. She reports symptoms are evoked when laying down and turning her head to the left. She reports episodes lasting hours. Patient was seen by PT with no evidence of BPPV and has not had any vertiginous episodes in the last 2 weeks. Tympanometry:   Right: Type A - normal middle ear pressure and compliance   Left: Type A - normal middle ear pressure and compliance    Oculomotor battery:    Gaze:          Right: Normal        Left: Normal         Up: Normal        Down: Normal      Tracking: Abnormal Gain left cycle only    Saccades: Normal     Optokinetic: Normal    Positioning/Positionals:     [employer]:    Right:  17 degrees purely upbeat nystagmus  , mildly symptomatic    Left:  17 degrees purely upbeat nystagmus , mildly symptomatic    Head hanging straight:  23 degrees purely upbeat nystagmus    Horizontal Head Roll:  Negative      Positionals:     Sitting: Normal    Supine:  9 degrees upbeating nystagmus, suppressed with fixation    Head Right:  17 degrees upbeating nystagmus, suppressed with fixation    Head Left:  9 degrees upbeating nystagmus, 5 degrees leftbeating nystagmus , suppressed with fixation    Body Right:  9 degrees upbeating nystagmus    Body Left: Normal    30 degrees Supine: Normal      Calorics:     Bithermal Caloric Irrigation: Normal  18 degrees right weakness    Results:  Purely upbeating nystagmus with head right [employer], head left [employer], and head hanging straight may be indicative 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     Presence of persistent upbeating nystagmus, suppressed with fixation is indicative of a peripheral issue. No peripheral weakness was identified today. Recommendations: Follow up with managing physician  Consider re-evaluation of patient by PT for possible bilateral PC BPPV.         Yenifer Grant, Ryan, CCC-A  Clinical Audiologist

## 2023-11-20 DIAGNOSIS — K21.9 GASTROESOPHAGEAL REFLUX DISEASE WITHOUT ESOPHAGITIS: ICD-10-CM

## 2023-11-20 DIAGNOSIS — R11.0 CHRONIC NAUSEA: ICD-10-CM

## 2023-11-20 RX ORDER — OMEPRAZOLE 20 MG/1
20 CAPSULE, DELAYED RELEASE ORAL DAILY
Qty: 60 CAPSULE | Refills: 2 | Status: SHIPPED | OUTPATIENT
Start: 2023-11-20

## 2024-05-17 DIAGNOSIS — K21.9 GASTROESOPHAGEAL REFLUX DISEASE WITHOUT ESOPHAGITIS: ICD-10-CM

## 2024-05-17 DIAGNOSIS — R11.0 CHRONIC NAUSEA: ICD-10-CM

## 2024-05-17 RX ORDER — OMEPRAZOLE 20 MG/1
20 CAPSULE, DELAYED RELEASE ORAL DAILY
Qty: 60 CAPSULE | Refills: 2 | Status: SHIPPED | OUTPATIENT
Start: 2024-05-17 | End: 2024-05-24

## 2024-05-17 NOTE — TELEPHONE ENCOUNTER
I lmom for pt to please call back to schedule a follow up appointment for a medication refill.  Informed pt that Dr Sims has retired and that we could get her scheduled with another GI provider. Will call again if do not hear back from her.

## 2024-05-24 NOTE — TELEPHONE ENCOUNTER
Called and spoke to pt whom informed she is no longer taking the medication and is doing great, does not wish to schedule an office visit. Could you please close this encounter? Thank you so much!